# Patient Record
(demographics unavailable — no encounter records)

---

## 2025-07-20 NOTE — DISCUSSION/SUMMARY
[de-identified] : A discussion regarding available pain management treatment options occurred with the patient. These included interventional, rehabilitative, pharmacological, and alternative modalities. We will proceed with the following:    Interventional treatment options: - None indicated at this time.   Medications: 1. Ordered Methylprednisolone 21-tablet, 6-day taper pack.   I advised Mr. Mcmillan that the steroid should be taken with food.  In addition, while taking the prescribed steroid, no over the counter or other NSAIDs should be used, such as ibuprofen (Motrin or Advil) or naproxen (Aleve) as this can cause stomach upset or other side effects.  If needed for fever or breakthrough pain Tylenol can be used.   - Follow up in 1 week.   I Arlene Feng attest that this documentation has been prepared under the direction and in the presence of provider Dr. Mekhi Patino.  The documentation recorded by the scribe in my presence, accurately reflects the service I personally performed, and the decisions made by me with my edits as appropriate.   Mekhi Patino, DO

## 2025-07-20 NOTE — HISTORY OF PRESENT ILLNESS
[FreeTextEntry1] : Mr. Mcmillan is a 72 year old male presenting to establish care for her bilateral knee pain, worse on the right. It is to be noted that he does suffer from gout and is currently in an active flare up in his right big toe. The pain is his right knee recently started over night. He has been experiencing swelling, tenderness to palpation along with redness. He has been having pain with applying any pressure over the knee. He rates the pain at a 7/10 on the pain scale. He denies any bowel/bladder incontinence, fevers/chills, recent weight loss or any saddle anesthesia.

## 2025-07-20 NOTE — DISCUSSION/SUMMARY
[de-identified] : A discussion regarding available pain management treatment options occurred with the patient. These included interventional, rehabilitative, pharmacological, and alternative modalities. We will proceed with the following:    Interventional treatment options: - None indicated at this time.   Medications: 1. Ordered Methylprednisolone 21-tablet, 6-day taper pack.   I advised Mr. Mcmillan that the steroid should be taken with food.  In addition, while taking the prescribed steroid, no over the counter or other NSAIDs should be used, such as ibuprofen (Motrin or Advil) or naproxen (Aleve) as this can cause stomach upset or other side effects.  If needed for fever or breakthrough pain Tylenol can be used.   - Follow up in 1 week.   I Arlene Feng attest that this documentation has been prepared under the direction and in the presence of provider Dr. Mekhi Patino.  The documentation recorded by the scribe in my presence, accurately reflects the service I personally performed, and the decisions made by me with my edits as appropriate.   Mekhi Patino, DO